# Patient Record
Sex: MALE | Race: BLACK OR AFRICAN AMERICAN | NOT HISPANIC OR LATINO | URBAN - METROPOLITAN AREA
[De-identification: names, ages, dates, MRNs, and addresses within clinical notes are randomized per-mention and may not be internally consistent; named-entity substitution may affect disease eponyms.]

---

## 2018-10-25 ENCOUNTER — OUTPATIENT (OUTPATIENT)
Dept: OUTPATIENT SERVICES | Facility: HOSPITAL | Age: 43
LOS: 1 days | End: 2018-10-25
Payer: COMMERCIAL

## 2018-10-25 DIAGNOSIS — Z23 ENCOUNTER FOR IMMUNIZATION: ICD-10-CM

## 2018-10-25 PROCEDURE — 90471 IMMUNIZATION ADMIN: CPT

## 2018-10-25 PROCEDURE — G0008: CPT

## 2018-10-25 PROCEDURE — 90715 TDAP VACCINE 7 YRS/> IM: CPT

## 2018-10-25 PROCEDURE — 90686 IIV4 VACC NO PRSV 0.5 ML IM: CPT

## 2019-09-13 ENCOUNTER — EMERGENCY (EMERGENCY)
Facility: HOSPITAL | Age: 44
LOS: 1 days | Discharge: HOME | End: 2019-09-13
Admitting: EMERGENCY MEDICINE
Payer: COMMERCIAL

## 2019-09-13 VITALS
TEMPERATURE: 98 F | WEIGHT: 156.97 LBS | SYSTOLIC BLOOD PRESSURE: 138 MMHG | DIASTOLIC BLOOD PRESSURE: 94 MMHG | HEIGHT: 64 IN | RESPIRATION RATE: 18 BRPM | HEART RATE: 78 BPM | OXYGEN SATURATION: 99 %

## 2019-09-13 DIAGNOSIS — J06.9 ACUTE UPPER RESPIRATORY INFECTION, UNSPECIFIED: ICD-10-CM

## 2019-09-13 DIAGNOSIS — R05 COUGH: ICD-10-CM

## 2019-09-13 LAB
FLU A RESULT: SIGNIFICANT CHANGE UP
FLU A RESULT: SIGNIFICANT CHANGE UP
FLUAV AG NPH QL: SIGNIFICANT CHANGE UP
FLUBV AG NPH QL: SIGNIFICANT CHANGE UP
RSV RESULT: SIGNIFICANT CHANGE UP
RSV RNA RESP QL NAA+PROBE: SIGNIFICANT CHANGE UP

## 2019-09-13 PROCEDURE — 94640 AIRWAY INHALATION TREATMENT: CPT

## 2019-09-13 PROCEDURE — 99284 EMERGENCY DEPT VISIT MOD MDM: CPT | Mod: 25

## 2019-09-13 PROCEDURE — 71046 X-RAY EXAM CHEST 2 VIEWS: CPT | Mod: 26

## 2019-09-13 PROCEDURE — 87631 RESP VIRUS 3-5 TARGETS: CPT

## 2019-09-13 PROCEDURE — 71046 X-RAY EXAM CHEST 2 VIEWS: CPT

## 2019-09-13 RX ORDER — PSEUDOEPHEDRINE HCL 30 MG
1 TABLET ORAL
Qty: 16 | Refills: 0
Start: 2019-09-13 | End: 2019-09-16

## 2019-09-13 RX ORDER — IPRATROPIUM/ALBUTEROL SULFATE 18-103MCG
3 AEROSOL WITH ADAPTER (GRAM) INHALATION ONCE
Refills: 0 | Status: COMPLETED | OUTPATIENT
Start: 2019-09-13 | End: 2019-09-13

## 2019-09-13 RX ORDER — ALBUTEROL 90 UG/1
2 AEROSOL, METERED ORAL
Qty: 16 | Refills: 0
Start: 2019-09-13 | End: 2019-10-12

## 2019-09-13 RX ADMIN — Medication 3 MILLILITER(S): at 13:56

## 2019-09-13 NOTE — ED PROVIDER NOTE - CLINICAL SUMMARY MEDICAL DECISION MAKING FREE TEXT BOX
Patient with URI symptoms, productive cough afebrile and well appearing.  Flu swab and cxr neg. Recommend supportive care and f/u with PMD.

## 2019-09-13 NOTE — ED PROVIDER NOTE - PATIENT PORTAL LINK FT
You can access the FollowMyHealth Patient Portal offered by [facility] by registering at the following website: [email or web address]. By joining [facility]’s FollowMyHealth portal, you will also be able to view your health information using other applications (apps) compatible with our system.

## 2019-09-13 NOTE — ED PROVIDER NOTE - NSFOLLOWUPINSTRUCTIONS_ED_ALL_ED_FT
[place] FrenchEnglishSpanish    Cough, Adult  Image   A cough helps to clear your throat and lungs. A cough may last only 2–3 weeks (acute), or it may last longer than 8 weeks (chronic). Many different things can cause a cough. A cough may be a sign of an illness or another medical condition.    Follow these instructions at home:  Pay attention to any changes in your cough.  Take medicines only as told by your doctor.  If you were prescribed an antibiotic medicine, take it as told by your doctor. Do not stop taking it even if you start to feel better.  Talk with your doctor before you try using a cough medicine.  Drink enough fluid to keep your pee (urine) clear or pale yellow.  If the air is dry, use a cold steam vaporizer or humidifier in your home.  Stay away from things that make you cough at work or at home.  If your cough is worse at night, try using extra pillows to raise your head up higher while you sleep.  Do not smoke, and try not to be around smoke. If you need help quitting, ask your doctor.  Do not have caffeine.  Do not drink alcohol.  Rest as needed.  Contact a doctor if:  You have new problems (symptoms).  You cough up yellow fluid (pus).  Your cough does not get better after 2–3 weeks, or your cough gets worse.  Medicine does not help your cough and you are not sleeping well.  You have pain that gets worse or pain that is not helped with medicine.  You have a fever.  You are losing weight and you do not know why.  You have night sweats.  Get help right away if:  You cough up blood.  You have trouble breathing.  Your heartbeat is very fast.  This information is not intended to replace advice given to you by your health care provider. Make sure you discuss any questions you have with your health care provider.    Document Released: 08/30/2012 Document Revised: 05/25/2017 Document Reviewed: 02/24/2016  [employer] Interactive Patient Education © 2019 [employer]

## 2019-09-13 NOTE — ED PROVIDER NOTE - ENMT, MLM
Airway patent, Nasal mucosa clear. Mouth with normal mucosa. Throat has no vesicles, no oropharyngeal exudates and uvula is midline. Airway patent, Nasal mucosa clear. Mouth with normal mucosa. Throat has no vesicles, no oropharyngeal exudates and uvula is midline.  + mild  sinus pressure with palpation.

## 2019-09-13 NOTE — ED ADULT NURSE NOTE - OBJECTIVE STATEMENT
Pt presents to ED with c/o cough x1 week, productive with dark yellow sputum. Endorses intermittent SOB, hx asthma but states he has only had to use his inhaler once in the past week. Denies sore throat, HA, fever/chills, no nausea/vomiting. Smokes cigarettes daily. Lungs CTA b/l, able to speak in full sentences, O2 sat WNL.

## 2019-09-13 NOTE — ED PROVIDER NOTE - OBJECTIVE STATEMENT
44 y/o M with PMHx of bronchitis and asthma (does not use inhaler) presents to the ED with complaints of productive cough x 1 week. Cough is productive of yellow sputum with associated shortness of breath. 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 Patient has history of smoking and notes sick contact at home. Denies fever, URI symptoms or any other acute complaints. 42 y/o M with PMHx of bronchitis and asthma (does not use inhaler) presents to the ED with complaints of productive cough x 1 week. Cough is productive of yellow sputum with associated shortness of breath and nasal congestion.. 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 Patient has history of smoking and +notes sick contact at home. Denies fever, ear/throat pain ,chest pain, n,v, abd pain or any other acute complaints.

## 2021-03-09 NOTE — ED PROVIDER NOTE - PENDING LAB RAD OPT OUT
Conjuntivae and eyelids appear normal,  Sclerae : White without injection Exclude Pending Lab and Radiology orders from printing on the Patient's Discharge Instructions, due to Privacy Concerns.